# Patient Record
Sex: MALE | Race: WHITE
[De-identification: names, ages, dates, MRNs, and addresses within clinical notes are randomized per-mention and may not be internally consistent; named-entity substitution may affect disease eponyms.]

---

## 2021-10-31 ENCOUNTER — HOSPITAL ENCOUNTER (INPATIENT)
Dept: HOSPITAL 4 - SED | Age: 51
LOS: 1 days | Discharge: HOME | DRG: 313 | End: 2021-11-01
Attending: FAMILY MEDICINE | Admitting: FAMILY MEDICINE
Payer: COMMERCIAL

## 2021-10-31 VITALS — WEIGHT: 189.06 LBS | BODY MASS INDEX: 29.67 KG/M2 | HEIGHT: 67 IN

## 2021-10-31 VITALS — SYSTOLIC BLOOD PRESSURE: 137 MMHG

## 2021-10-31 DIAGNOSIS — Z79.899: ICD-10-CM

## 2021-10-31 DIAGNOSIS — E78.5: ICD-10-CM

## 2021-10-31 DIAGNOSIS — E87.6: ICD-10-CM

## 2021-10-31 DIAGNOSIS — R73.9: ICD-10-CM

## 2021-10-31 DIAGNOSIS — Z82.49: ICD-10-CM

## 2021-10-31 DIAGNOSIS — E66.3: ICD-10-CM

## 2021-10-31 DIAGNOSIS — I11.9: ICD-10-CM

## 2021-10-31 DIAGNOSIS — E83.52: ICD-10-CM

## 2021-10-31 DIAGNOSIS — Z20.822: ICD-10-CM

## 2021-10-31 DIAGNOSIS — R07.89: Primary | ICD-10-CM

## 2021-10-31 LAB
ALBUMIN SERPL BCP-MCNC: 3.6 G/DL (ref 3.4–4.8)
ALT SERPL W P-5'-P-CCNC: 62 U/L (ref 12–78)
AMPHETAMINES UR QL SCN: NEGATIVE
ANION GAP SERPL CALCULATED.3IONS-SCNC: 8 MMOL/L (ref 5–15)
AST SERPL W P-5'-P-CCNC: 26 U/L (ref 10–37)
BARBITURATES UR QL SCN: NEGATIVE
BASOPHILS # BLD AUTO: 0.1 K/UL (ref 0–0.2)
BASOPHILS NFR BLD AUTO: 1.1 % (ref 0–2)
BENZODIAZ UR QL SCN: NEGATIVE
BILIRUB SERPL-MCNC: 0.4 MG/DL (ref 0–1)
BUN SERPL-MCNC: 14 MG/DL (ref 8–21)
BZE UR QL SCN: NEGATIVE
CALCIUM SERPL-MCNC: 8.3 MG/DL (ref 8.4–11)
CANNABINOIDS UR QL SCN: NEGATIVE
CHLORIDE SERPL-SCNC: 100 MMOL/L (ref 98–107)
CREAT SERPL-MCNC: 1.3 MG/DL (ref 0.55–1.3)
EOSINOPHIL # BLD AUTO: 0.1 K/UL (ref 0–0.4)
EOSINOPHIL NFR BLD AUTO: 1.6 % (ref 0–4)
ERYTHROCYTE [DISTWIDTH] IN BLOOD BY AUTOMATED COUNT: 13.7 % (ref 9–15)
GFR SERPL CREATININE-BSD FRML MDRD: 75 ML/MIN (ref 90–?)
GLUCOSE SERPL-MCNC: 107 MG/DL (ref 70–99)
HCT VFR BLD AUTO: 43.1 % (ref 36–54)
HGB BLD-MCNC: 15.3 G/DL (ref 14–18)
LYMPHOCYTES # BLD AUTO: 2 K/UL (ref 1–5.5)
LYMPHOCYTES NFR BLD AUTO: 32.2 % (ref 20.5–51.5)
MCH RBC QN AUTO: 30 PG (ref 27–31)
MCHC RBC AUTO-ENTMCNC: 36 % (ref 32–36)
MCV RBC AUTO: 85 FL (ref 79–98)
METHADONE UR-SCNC: NEGATIVE UMOL/L
METHAMPHET UR-SCNC: NEGATIVE UMOL/L
MONOCYTES # BLD MANUAL: 0.5 K/UL (ref 0–1)
MONOCYTES # BLD MANUAL: 8.2 % (ref 1.7–9.3)
NEUTROPHILS # BLD AUTO: 3.6 K/UL (ref 1.8–7.7)
NEUTROPHILS NFR BLD AUTO: 56.9 % (ref 40–70)
OPIATES UR QL SCN: NEGATIVE
OXYCODONE SERPL-MCNC: NEGATIVE NG/ML
PCP UR QL SCN: NEGATIVE
PLATELET # BLD AUTO: 183 K/UL (ref 130–430)
POTASSIUM SERPL-SCNC: 2.8 MMOL/L (ref 3.5–5.1)
RBC # BLD AUTO: 5.06 MIL/UL (ref 4.2–6.2)
SODIUM SERPLBLD-SCNC: 136 MMOL/L (ref 136–145)
TRICYCLICS UR-MCNC: NEGATIVE NG/ML
URINE PROPOXYPHENE SCREEN: NEGATIVE
WBC # BLD AUTO: 6.3 K/UL (ref 4.8–10.8)

## 2021-10-31 PROCEDURE — G0378 HOSPITAL OBSERVATION PER HR: HCPCS

## 2021-10-31 RX ADMIN — POTASSIUM CHLORIDE SCH MLS/HR: 200 INJECTION, SOLUTION INTRAVENOUS at 12:12

## 2021-10-31 RX ADMIN — POTASSIUM CHLORIDE SCH MLS/HR: 200 INJECTION, SOLUTION INTRAVENOUS at 13:15

## 2021-10-31 NOTE — NUR
Patient will be admitted to care of [facility].  Admitted to TELE unit. PENDING 
BED ASSIGNMENT.  Belongings list completed.  Complete and up to date summary 
report printed. SBAR report to be given at bedside with opportunity for 
questions.

## 2021-10-31 NOTE — NUR
Patient will be admitted to care of .  Admitted to TELE unit.  Will 
go to room 116B.  Belongings list completed.  Complete and up to date summary 
report printed. SBAR report to be given at bedside with opportunity for 
questions.

## 2021-10-31 NOTE — NUR
2120  Late entry due to patient care



ADMISSION NOTE

Received patient from ER via gurney. Patient admitted with diagnosis of Chest Pain. Patient 
is awake, alert, oriented X 4. Patient oriented to hospital room, call light, toileting, 
pain management and safety-teach back done. Patient informed that BENJI Washington will be primary 
nurse and that their room number is 116A. Personal belongings checked and Belongings List 
documented. Call light within reach.

## 2021-10-31 NOTE — NUR
PATIENT AAOX4 BIB SQ 61 C/O SUBSTERNAL CHEST PAIN THAT AWOKE HIM UP FROM SLEEP. 
PT STATED HAVING THIS FEELING BEFORE BUT WORSENING. WAS GIVEN  AND NITRO 
0/4 X2 ON SCENE BY EMS. ARRIVED WITH 18 GAUGE IV TO LEFT AC. FLUSHES WITHOUT 
DIFFICULTIES AND GOOD BLOOD RETURN. VSS. DENIES ANY SOB, N/V/D. HX OF HTN AND  
HIGH CHOLESTEROL.

## 2021-10-31 NOTE — NUR
Medication reconciliation completed with information provided by PATIENT . Any 
prior medication reconciliation on file was reviewed and corrected.

## 2021-11-01 VITALS — SYSTOLIC BLOOD PRESSURE: 118 MMHG

## 2021-11-01 VITALS — SYSTOLIC BLOOD PRESSURE: 116 MMHG

## 2021-11-01 NOTE — NUR
CLOSING NOTES;

pt. been sleeping since admission. IV lock intact. denies any chest pain. for further care 
and assist. call light within reach. will endorse to incomin shift. bed in low position.

## 2021-11-01 NOTE — NUR
POTASSIUM REPLACEMENT

EXPLAINED TO PATIENT THAT HIS BLOOD POTASSIUM LEVEL WAS LOW AND WOULD NEED REPLACEMENT. 
PATIENT VERBALIZED UNDERSTANDING. POTASSIUM REPLACEMENTS ADMINISTERED AS ORDERED.

## 2021-11-01 NOTE — NUR
NOTES:

checked pt. and sleeping. cardia pattern shows sinus eri cardia. low 50's. pt. 
atmptomatic.

## 2021-11-01 NOTE — NUR
OPENING NOTES

AWAKE AND ORIENTED. DENIES ANY CHEST PAIN. NO SHORTNESS OF BREATH ON ROOM AIR. PLAN OF CARE 
EXPLAINED TO PATIENT AND HE VERBALIZED UNDERSTANDING. SAFETY CHECKS DONE. CALL LIGHT WITHIN 
REACH. SERVED WITH BREAKFAST. WILL MONITOR.

## 2021-11-01 NOTE — NUR
D/C Patient

Patient given medication reconciliation form and D/C instructions. Exit Care provided. 
Patient verbalized understanding. MD discussed with patient the results and treatment 
provided. Ambulatory with steady gait for discharge to home. Patient in stable condition, ID 
band removed. IV catheter removed, intact and dressing applied, no active bleeding. Rx of 
Aspirin and Atorvastatin given. Patient educated on pain management. All belongings sent 
with patient.

## 2022-01-20 ENCOUNTER — HOSPITAL ENCOUNTER (EMERGENCY)
Dept: HOSPITAL 87 - ER | Age: 52
Discharge: HOME | End: 2022-01-20
Payer: COMMERCIAL

## 2022-01-20 VITALS — DIASTOLIC BLOOD PRESSURE: 77 MMHG | SYSTOLIC BLOOD PRESSURE: 120 MMHG

## 2022-01-20 VITALS — BODY MASS INDEX: 29.07 KG/M2 | WEIGHT: 185.19 LBS | HEIGHT: 67 IN

## 2022-01-20 DIAGNOSIS — I10: ICD-10-CM

## 2022-01-20 DIAGNOSIS — E78.5: ICD-10-CM

## 2022-01-20 DIAGNOSIS — R07.89: Primary | ICD-10-CM

## 2022-01-20 LAB
APPEARANCE UR: CLEAR
BASOPHILS NFR BLD AUTO: 1 % (ref 0–2)
CHLORIDE SERPL-SCNC: 104 MEQ/L (ref 98–107)
COLOR UR: YELLOW
EOSINOPHIL NFR BLD AUTO: 1.9 % (ref 0–5)
ERYTHROCYTE [DISTWIDTH] IN BLOOD BY AUTOMATED COUNT: 13.7 % (ref 11.6–14.6)
HCT VFR BLD AUTO: 50.5 % (ref 42–52)
HGB BLD-MCNC: 17.3 G/DL (ref 14–18)
HGB UR QL STRIP: NEGATIVE
KETONES UR STRIP-MCNC: NEGATIVE MG/DL
LEUKOCYTE ESTERASE UR QL STRIP: NEGATIVE
LYMPHOCYTES NFR BLD AUTO: 27.9 % (ref 20–50)
MCH RBC QN AUTO: 28.5 PG (ref 28–32)
MCV RBC AUTO: 83.1 FL (ref 80–94)
MONOCYTES NFR BLD AUTO: 9.1 % (ref 2–8)
NEUTROPHILS NFR BLD AUTO: 60.1 % (ref 40–76)
NITRITE UR QL STRIP: NEGATIVE
PH UR STRIP: 7.5 [PH] (ref 4.5–8)
PLATELET # BLD AUTO: 270 X1000/UL (ref 130–400)
PMV BLD AUTO: 7.2 FL (ref 7.4–10.4)
PROT UR QL STRIP: NEGATIVE
RBC # BLD AUTO: 6.08 MILL/UL (ref 4.7–6.1)
SP GR UR STRIP: 1.01 (ref 1–1.03)
UROBILINOGEN UR STRIP-MCNC: 0.2 E.U./DL (ref 0.2–1)

## 2022-01-20 PROCEDURE — 80053 COMPREHEN METABOLIC PANEL: CPT

## 2022-01-20 PROCEDURE — 85025 COMPLETE CBC W/AUTO DIFF WBC: CPT

## 2022-01-20 PROCEDURE — 84484 ASSAY OF TROPONIN QUANT: CPT

## 2022-01-20 PROCEDURE — 93005 ELECTROCARDIOGRAM TRACING: CPT

## 2022-01-20 PROCEDURE — 81003 URINALYSIS AUTO W/O SCOPE: CPT

## 2022-01-20 PROCEDURE — 71045 X-RAY EXAM CHEST 1 VIEW: CPT

## 2022-01-20 PROCEDURE — 83690 ASSAY OF LIPASE: CPT

## 2022-01-20 PROCEDURE — 36415 COLL VENOUS BLD VENIPUNCTURE: CPT

## 2022-01-20 PROCEDURE — 96372 THER/PROPH/DIAG INJ SC/IM: CPT

## 2022-01-20 PROCEDURE — 83880 ASSAY OF NATRIURETIC PEPTIDE: CPT

## 2022-01-20 PROCEDURE — 99285 EMERGENCY DEPT VISIT HI MDM: CPT
